# Patient Record
(demographics unavailable — no encounter records)

---

## 2024-10-15 NOTE — HISTORY OF PRESENT ILLNESS
[de-identified] : 13-year-old male, accompanied by mother, presents for follow-up for right third and fourth toes.  Patient states he is feeling much better he says there is "not really" any pain.

## 2024-10-15 NOTE — PHYSICAL EXAM
[de-identified] : Physical exam right toes: No erythema, ecchymosis, edema.  No tenderness palpation of the toes.  Full range of motion of the toes with no pain.

## 2024-10-15 NOTE — DISCUSSION/SUMMARY
[de-identified] : Patient is recovering well.  At this time he is cleared to return back to sports physical activity starting on Monday.  Patient has a soccer him tired he really wants to go to a state he can try playing as tolerated as the x-rays do show good callus formation and healing and it has been 5 weeks

## 2024-10-15 NOTE — DATA REVIEWED
[FreeTextEntry1] : X-ray images were obtained at the office today.  AP, lateral, oblique views of the right toes reveal callus formation of the proximal phalanx of the third and fourth toes

## 2024-12-06 NOTE — PHYSICAL EXAM
[Not Examined] : not examined [Normal] : The patient is moving all extremities spontaneously without any gross neurologic deficits. They walk with a fluid nonantalgic gait. There are equal and symmetric deep tendon reflexes in the upper and lower extremities bilaterally. There is gross intact sensation to soft and light touch in the bilateral upper and lower extremities [de-identified] :  ISLT Intact Motor pain over tibial tubercule on palpation

## 2024-12-06 NOTE — PHYSICAL EXAM
[Not Examined] : not examined [Normal] : The patient is moving all extremities spontaneously without any gross neurologic deficits. They walk with a fluid nonantalgic gait. There are equal and symmetric deep tendon reflexes in the upper and lower extremities bilaterally. There is gross intact sensation to soft and light touch in the bilateral upper and lower extremities [de-identified] :  ISLT Intact Motor pain over tibial tubercule on palpation

## 2024-12-06 NOTE — ASSESSMENT
[FreeTextEntry1] : [facility] Patient is to go to physical therapy Patient will follow up PRN